# Patient Record
Sex: FEMALE | Race: BLACK OR AFRICAN AMERICAN | NOT HISPANIC OR LATINO | Employment: PART TIME | URBAN - METROPOLITAN AREA
[De-identification: names, ages, dates, MRNs, and addresses within clinical notes are randomized per-mention and may not be internally consistent; named-entity substitution may affect disease eponyms.]

---

## 2017-01-04 ENCOUNTER — APPOINTMENT (OUTPATIENT)
Dept: LAB | Facility: HOSPITAL | Age: 57
End: 2017-01-04
Attending: INTERNAL MEDICINE
Payer: MEDICARE

## 2017-01-04 PROCEDURE — 36415 COLL VENOUS BLD VENIPUNCTURE: CPT

## 2017-01-04 PROCEDURE — 84305 ASSAY OF SOMATOMEDIN: CPT

## 2017-01-04 PROCEDURE — 84439 ASSAY OF FREE THYROXINE: CPT

## 2017-01-04 PROCEDURE — 82024 ASSAY OF ACTH: CPT

## 2017-01-04 PROCEDURE — 82533 TOTAL CORTISOL: CPT

## 2017-01-04 PROCEDURE — 80053 COMPREHEN METABOLIC PANEL: CPT

## 2017-01-10 ENCOUNTER — TELEPHONE (OUTPATIENT)
Dept: NEUROSURGERY | Facility: CLINIC | Age: 57
End: 2017-01-10

## 2017-01-10 NOTE — TELEPHONE ENCOUNTER
Advised patient Dr. Hoff is out sick today. Will call patient with new appointment date and time. Understanding verbalized.

## 2017-01-25 ENCOUNTER — TELEPHONE (OUTPATIENT)
Dept: ENDOCRINOLOGY | Facility: CLINIC | Age: 57
End: 2017-01-25

## 2017-01-25 DIAGNOSIS — R79.89 LOW IGF-1 LEVEL: Primary | ICD-10-CM

## 2017-01-26 NOTE — TELEPHONE ENCOUNTER
Please let Ms.Kathy Montemayor know her all the other pituitary blood work is normal but her IGF-1 ( indicator og growth hormone production is low, this well could be a lab assay error  So we can repeat it.   It is not urgent but needs to be repeated

## 2017-01-30 ENCOUNTER — TELEPHONE (OUTPATIENT)
Dept: NEUROSURGERY | Facility: CLINIC | Age: 57
End: 2017-01-30

## 2018-06-13 ENCOUNTER — OFFICE VISIT (OUTPATIENT)
Dept: INTERNAL MEDICINE | Facility: CLINIC | Age: 58
End: 2018-06-13
Payer: MEDICARE

## 2018-06-13 VITALS
HEIGHT: 65 IN | WEIGHT: 188.25 LBS | OXYGEN SATURATION: 98 % | BODY MASS INDEX: 31.36 KG/M2 | HEART RATE: 62 BPM | DIASTOLIC BLOOD PRESSURE: 86 MMHG | SYSTOLIC BLOOD PRESSURE: 124 MMHG

## 2018-06-13 DIAGNOSIS — Z00.00 ANNUAL PHYSICAL EXAM: Primary | ICD-10-CM

## 2018-06-13 DIAGNOSIS — Z12.4 ENCOUNTER FOR SCREENING FOR MALIGNANT NEOPLASM OF CERVIX: ICD-10-CM

## 2018-06-13 DIAGNOSIS — Z13.6 SCREENING, HEART DISEASE, ISCHEMIC: ICD-10-CM

## 2018-06-13 DIAGNOSIS — E23.6 PITUITARY APOPLEXY: ICD-10-CM

## 2018-06-13 DIAGNOSIS — Z12.31 SCREENING MAMMOGRAM, ENCOUNTER FOR: ICD-10-CM

## 2018-06-13 DIAGNOSIS — D35.2 PITUITARY ADENOMA: ICD-10-CM

## 2018-06-13 DIAGNOSIS — H53.411 CENTRAL SCOTOMA, RIGHT EYE: ICD-10-CM

## 2018-06-13 DIAGNOSIS — Z12.11 SCREENING FOR MALIGNANT NEOPLASM OF COLON: ICD-10-CM

## 2018-06-13 DIAGNOSIS — Z13.0 SCREENING FOR DEFICIENCY ANEMIA: ICD-10-CM

## 2018-06-13 LAB
BILIRUB UR QL STRIP: NEGATIVE
CLARITY UR REFRACT.AUTO: CLEAR
COLOR UR AUTO: YELLOW
GLUCOSE UR QL STRIP: NEGATIVE
HGB UR QL STRIP: NEGATIVE
KETONES UR QL STRIP: NEGATIVE
LEUKOCYTE ESTERASE UR QL STRIP: ABNORMAL
MICROSCOPIC COMMENT: NORMAL
NITRITE UR QL STRIP: NEGATIVE
PH UR STRIP: 7 [PH] (ref 5–8)
PROT UR QL STRIP: NEGATIVE
SP GR UR STRIP: 1.02 (ref 1–1.03)
SQUAMOUS #/AREA URNS AUTO: 1 /HPF
URN SPEC COLLECT METH UR: ABNORMAL
UROBILINOGEN UR STRIP-ACNC: 2 EU/DL
WBC #/AREA URNS AUTO: 5 /HPF (ref 0–5)

## 2018-06-13 PROCEDURE — 99999 PR PBB SHADOW E&M-EST. PATIENT-LVL IV: CPT | Mod: PBBFAC,,, | Performed by: NURSE PRACTITIONER

## 2018-06-13 PROCEDURE — 99204 OFFICE O/P NEW MOD 45 MIN: CPT | Mod: S$PBB,,, | Performed by: NURSE PRACTITIONER

## 2018-06-13 PROCEDURE — 99214 OFFICE O/P EST MOD 30 MIN: CPT | Mod: PBBFAC | Performed by: NURSE PRACTITIONER

## 2018-06-13 PROCEDURE — 81001 URINALYSIS AUTO W/SCOPE: CPT
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
## 2018-06-13 NOTE — PATIENT INSTRUCTIONS
Follow up with endocrine and neurosurgery clinics.   Fasting blood work - schedule  Mammogram - schedule   C-scope- call number to set that up   Make appointment with Gyn         RTC in 3 months with new PCP for establish care appointment